# Patient Record
Sex: FEMALE | Race: WHITE | NOT HISPANIC OR LATINO | Employment: UNEMPLOYED | URBAN - METROPOLITAN AREA
[De-identification: names, ages, dates, MRNs, and addresses within clinical notes are randomized per-mention and may not be internally consistent; named-entity substitution may affect disease eponyms.]

---

## 2017-06-30 ENCOUNTER — HISTORICAL (OUTPATIENT)
Dept: ADMINISTRATIVE | Facility: HOSPITAL | Age: 42
End: 2017-06-30

## 2022-02-22 ENCOUNTER — TELEPHONE (OUTPATIENT)
Dept: ORTHOPEDICS | Facility: CLINIC | Age: 47
End: 2022-02-22
Payer: MEDICAID

## 2022-02-22 DIAGNOSIS — M25.511 RIGHT SHOULDER PAIN, UNSPECIFIED CHRONICITY: Primary | ICD-10-CM

## 2022-02-23 ENCOUNTER — OFFICE VISIT (OUTPATIENT)
Dept: ORTHOPEDICS | Facility: CLINIC | Age: 47
End: 2022-02-23
Payer: MEDICAID

## 2022-02-23 VITALS
WEIGHT: 113.75 LBS | BODY MASS INDEX: 18.95 KG/M2 | HEART RATE: 109 BPM | DIASTOLIC BLOOD PRESSURE: 96 MMHG | SYSTOLIC BLOOD PRESSURE: 165 MMHG | HEIGHT: 65 IN

## 2022-02-23 DIAGNOSIS — M75.121 COMPLETE TEAR OF RIGHT ROTATOR CUFF, UNSPECIFIED WHETHER TRAUMATIC: Primary | ICD-10-CM

## 2022-02-23 DIAGNOSIS — Z01.818 PRE-OP TESTING: ICD-10-CM

## 2022-02-23 DIAGNOSIS — M25.511 ARTHRALGIA OF RIGHT ACROMIOCLAVICULAR JOINT: ICD-10-CM

## 2022-02-23 PROCEDURE — 3077F PR MOST RECENT SYSTOLIC BLOOD PRESSURE >= 140 MM HG: ICD-10-PCS | Mod: CPTII,,, | Performed by: ORTHOPAEDIC SURGERY

## 2022-02-23 PROCEDURE — 3080F DIAST BP >= 90 MM HG: CPT | Mod: CPTII,,, | Performed by: ORTHOPAEDIC SURGERY

## 2022-02-23 PROCEDURE — 99204 OFFICE O/P NEW MOD 45 MIN: CPT | Mod: S$PBB,,, | Performed by: ORTHOPAEDIC SURGERY

## 2022-02-23 PROCEDURE — 99204 PR OFFICE/OUTPT VISIT, NEW, LEVL IV, 45-59 MIN: ICD-10-PCS | Mod: S$PBB,,, | Performed by: ORTHOPAEDIC SURGERY

## 2022-02-23 PROCEDURE — 1159F MED LIST DOCD IN RCRD: CPT | Mod: CPTII,,, | Performed by: ORTHOPAEDIC SURGERY

## 2022-02-23 PROCEDURE — 1160F RVW MEDS BY RX/DR IN RCRD: CPT | Mod: CPTII,,, | Performed by: ORTHOPAEDIC SURGERY

## 2022-02-23 PROCEDURE — 3080F PR MOST RECENT DIASTOLIC BLOOD PRESSURE >= 90 MM HG: ICD-10-PCS | Mod: CPTII,,, | Performed by: ORTHOPAEDIC SURGERY

## 2022-02-23 PROCEDURE — 99999 PR PBB SHADOW E&M-EST. PATIENT-LVL V: ICD-10-PCS | Mod: PBBFAC,,, | Performed by: ORTHOPAEDIC SURGERY

## 2022-02-23 PROCEDURE — 1159F PR MEDICATION LIST DOCUMENTED IN MEDICAL RECORD: ICD-10-PCS | Mod: CPTII,,, | Performed by: ORTHOPAEDIC SURGERY

## 2022-02-23 PROCEDURE — 3008F BODY MASS INDEX DOCD: CPT | Mod: CPTII,,, | Performed by: ORTHOPAEDIC SURGERY

## 2022-02-23 PROCEDURE — 3077F SYST BP >= 140 MM HG: CPT | Mod: CPTII,,, | Performed by: ORTHOPAEDIC SURGERY

## 2022-02-23 PROCEDURE — 99999 PR PBB SHADOW E&M-EST. PATIENT-LVL V: CPT | Mod: PBBFAC,,, | Performed by: ORTHOPAEDIC SURGERY

## 2022-02-23 PROCEDURE — 99215 OFFICE O/P EST HI 40 MIN: CPT | Mod: PBBFAC,PN | Performed by: ORTHOPAEDIC SURGERY

## 2022-02-23 PROCEDURE — 1160F PR REVIEW ALL MEDS BY PRESCRIBER/CLIN PHARMACIST DOCUMENTED: ICD-10-PCS | Mod: CPTII,,, | Performed by: ORTHOPAEDIC SURGERY

## 2022-02-23 PROCEDURE — 3008F PR BODY MASS INDEX (BMI) DOCUMENTED: ICD-10-PCS | Mod: CPTII,,, | Performed by: ORTHOPAEDIC SURGERY

## 2022-02-23 RX ORDER — DEXTROAMPHETAMINE SACCHARATE, AMPHETAMINE ASPARTATE, DEXTROAMPHETAMINE SULFATE AND AMPHETAMINE SULFATE 5; 5; 5; 5 MG/1; MG/1; MG/1; MG/1
TABLET ORAL
COMMUNITY

## 2022-02-23 RX ORDER — ESTRADIOL 0.5 MG/1
TABLET ORAL
COMMUNITY

## 2022-02-23 RX ORDER — CYANOCOBALAMIN 1000 UG/ML
INJECTION, SOLUTION INTRAMUSCULAR; SUBCUTANEOUS
COMMUNITY
Start: 2021-06-07

## 2022-02-23 RX ORDER — GABAPENTIN 300 MG/1
CAPSULE ORAL
COMMUNITY
Start: 2022-02-15

## 2022-02-23 RX ORDER — ERGOCALCIFEROL 1.25 MG/1
CAPSULE ORAL
COMMUNITY
Start: 2021-11-29

## 2022-02-23 RX ORDER — TIZANIDINE 4 MG/1
TABLET ORAL
COMMUNITY
Start: 2021-12-17

## 2022-02-23 RX ORDER — TRAMADOL HYDROCHLORIDE 50 MG/1
TABLET ORAL
COMMUNITY
End: 2022-07-05

## 2022-02-23 RX ORDER — ALENDRONATE SODIUM 70 MG/1
TABLET ORAL
COMMUNITY
End: 2022-07-12

## 2022-02-23 RX ORDER — HYDROCODONE BITARTRATE AND ACETAMINOPHEN 7.5; 325 MG/1; MG/1
TABLET ORAL
COMMUNITY
End: 2022-07-05

## 2022-02-23 RX ORDER — MELOXICAM 15 MG/1
TABLET ORAL
COMMUNITY
End: 2022-07-05

## 2022-02-23 RX ORDER — ALPRAZOLAM 0.5 MG/1
TABLET ORAL
COMMUNITY
Start: 2021-12-17

## 2022-02-23 RX ORDER — ALBUTEROL SULFATE 90 UG/1
2 AEROSOL, METERED RESPIRATORY (INHALATION)
COMMUNITY
Start: 2021-09-14 | End: 2022-06-29

## 2022-02-23 RX ORDER — SUCRALFATE 1 G/1
TABLET ORAL
COMMUNITY

## 2022-02-23 RX ORDER — SERTRALINE HYDROCHLORIDE 100 MG/1
TABLET, FILM COATED ORAL
COMMUNITY
Start: 2021-06-02 | End: 2022-06-29

## 2022-02-23 RX ORDER — TRAZODONE HYDROCHLORIDE 100 MG/1
TABLET ORAL
COMMUNITY
Start: 2022-01-24

## 2022-02-23 NOTE — PROGRESS NOTES
Patient ID:   Tierra Servin is a 46 y.o. female.    Chief Complaint:   Right rotator cuff tear    HPI:   Mrs. Servin is a 46 year old female with a history of undergoing right arthroscopic rotator cuff repair in [place] in June 2018. Post-operatively, she continued to have pain and weakness in the right shoulder. She attempted multiple rounds of PT and injections, all with minimal relief. She was recently seen and [place] and referred here for consideration of repeat surgery. Her current pain level is 7/10. The pain is worse at night and with attempted elevation of the extremity. She denies any current relieving factors.     Medications:    Current Outpatient Medications:     albuterol (PROVENTIL/VENTOLIN HFA) 90 mcg/actuation inhaler, Inhale 2 puffs into the lungs., Disp: , Rfl:     alendronate (FOSAMAX) 70 MG tablet, alendronate 70 mg tablet, Disp: , Rfl:     ALPRAZolam (XANAX) 0.5 MG tablet, alprazolam 0.5 mg tablet, Disp: , Rfl:     cyanocobalamin 1,000 mcg/mL injection, cyanocobalamin (vit B-12) 1,000 mcg/mL injection solution, Disp: , Rfl:     dextroamphetamine-amphetamine (ADDERALL) 20 mg tablet, dextroamphetamine-amphetamine 20 mg tablet, Disp: , Rfl:     ergocalciferol (ERGOCALCIFEROL) 50,000 unit Cap, Vitamin D2 1,250 mcg (50,000 unit) capsule, Disp: , Rfl:     estradioL (ESTRACE) 0.5 MG tablet, estradiol 0.5 mg tablet, Disp: , Rfl:     gabapentin (NEURONTIN) 300 MG capsule, gabapentin 300 mg capsule, Disp: , Rfl:     HYDROcodone-acetaminophen (NORCO) 7.5-325 mg per tablet, hydrocodone 7.5 mg-acetaminophen 325 mg tablet, Disp: , Rfl:     meloxicam (MOBIC) 15 MG tablet, meloxicam 15 mg tablet, Disp: , Rfl:     sertraline (ZOLOFT) 100 MG tablet, sertraline 100 mg tablet, Disp: , Rfl:     sucralfate (CARAFATE) 1 gram tablet, sucralfate 1 gram tablet, Disp: , Rfl:     tiZANidine (ZANAFLEX) 4 MG tablet, tizanidine 4 mg tablet, Disp: , Rfl:     traMADoL (ULTRAM) 50 mg tablet, tramadol  "50 mg tablet, Disp: , Rfl:     traZODone (DESYREL) 100 MG tablet, , Disp: , Rfl:     Allergies:  Review of patient's allergies indicates:   Allergen Reactions    Codeine Itching    Sulfamethoxazole-trimethoprim Itching       Past Medical History:  Past Medical History:   Diagnosis Date    ADD (attention deficit disorder)     Anxiety     Depression     Fibromyalgia     GERD (gastroesophageal reflux disease)     Osteopenia     Plantar fasciitis         Past Surgical History:  Past Surgical History:   Procedure Laterality Date     SECTION      GASTRIC BYPASS      HYSTERECTOMY      SHOULDER SURGERY         Social History:  Social History     Occupational History    Not on file   Tobacco Use    Smoking status: Never Smoker    Smokeless tobacco: Never Used   Substance and Sexual Activity    Alcohol use: Never    Drug use: Never    Sexual activity: Never       Family History:  History reviewed. No pertinent family history.     ROS:  Review of Systems   Musculoskeletal: Positive for joint pain, muscle weakness, myalgias and stiffness.   Neurological: Negative for numbness, paresthesias and sensory change.       Vitals:  BP (!) 165/96 (BP Location: Left arm, Patient Position: Sitting, BP Method: Medium (Automatic))   Pulse 109   Ht 5' 5" (1.651 m)   Wt 51.6 kg (113 lb 12.1 oz)   BMI 18.93 kg/m²     Physical Examination:  Comprehensive Orthopaedic Musculoskeletal Exam    General        Constitutional: appears stated age, well-developed and well-nourished    Scleral icterus: no    Labored breathing: no    Psychiatric: normal mood and affect and no acute distress    Neurological: alert and oriented x3    Skin: intact    Lymphadenopathy: none    Upper Extremity    Inspection - Upper      Inspection - Upper Right      Shoulder      Negative for: erythema          Range of Motion - Upper      Range of Motion - Upper Right        Shoulder forward flexion: 140      Shoulder external rotation: 30   "   Right Shoulder Exam     Tenderness   The patient is experiencing tenderness in the acromioclavicular joint.    Range of Motion   External rotation: 30   Forward flexion: 140   Internal rotation 0 degrees: L4     Muscle Strength   Internal rotation: 5/5   External rotation: 4/5   Supraspinatus: 5/5     Tests   Harmon test: positive  Cross arm: positive  Impingement: positive    Other   Erythema: absent  Scars: present  Sensation: normal  Pulse: present        Imaging:  I have ordered and independently reviewed the following imaging studies performed:  XR and MRI performed at [facility] demonstrates AC joint arthritis, a retear of the supraspinatus with retraction to the mid-humeral head.     Assessment:  1. Complete tear of right rotator cuff, unspecified whether traumatic    2. Arthralgia of right acromioclavicular joint      Plan:  I have reviewed the findings in detail with the patient. I have recommended revision rotator cuff repair, subacromial decompression, open distal clavicle excision, and any other indicated procedures. I have also discussed use of the Regeneten patch to potentially improve her chances of healing given her prior failure. She has given consent to proceed and has chosen a surgery date of March 17, 2022.      No follow-ups on file.

## 2022-03-07 RX ORDER — SODIUM CHLORIDE 9 MG/ML
INJECTION, SOLUTION INTRAVENOUS CONTINUOUS
Status: CANCELLED | OUTPATIENT
Start: 2022-03-07

## 2022-03-11 ENCOUNTER — TELEPHONE (OUTPATIENT)
Dept: ORTHOPEDICS | Facility: CLINIC | Age: 47
End: 2022-03-11
Payer: MEDICAID

## 2022-03-11 NOTE — TELEPHONE ENCOUNTER
----- Message from Anton Celaya MD sent at 3/11/2022  2:21 PM CST -----  Contact: [phone]/ Self  I will put her on for April 7th.       ----- Message -----  From: Marcos Hubbard MA  Sent: 3/11/2022   2:15 PM CST  To: Anton Celaya MD    She says the beginning of April whatever you have available.   ----- Message -----  From: nAton Celaya MD  Sent: 3/11/2022  12:58 PM CST  To: Marcos Hubbard MA    What date does she want to reschedule to?    ----- Message -----  From: Marcos Hubbrad MA  Sent: 3/11/2022  10:37 AM CST  To: Anton Celaya MD      ----- Message -----  From: Catie Alvarez  Sent: 3/11/2022  10:17 AM CST  To: Belia Walton Staff    Type: Requesting to speak with nurse    Who Called: Pt   Regarding: reschedule procedure on 03/17/2022   Would the patient rather a call back or a response via [facility]? Call back  Best Call Back Number: [phone]  Additional Information: n/a

## 2022-03-29 ENCOUNTER — TELEPHONE (OUTPATIENT)
Dept: ORTHOPEDICS | Facility: CLINIC | Age: 47
End: 2022-03-29
Payer: MEDICAID

## 2022-03-29 NOTE — TELEPHONE ENCOUNTER
----- Message from Anton Celaya MD sent at 3/29/2022  7:17 AM CDT -----  May 26th      ----- Message -----  From: Marcos Hubbard MA  Sent: 3/28/2022   3:36 PM CDT  To: Anton Celaya MD    Patient called and asked if she could push her surgery back until the end of May

## 2022-04-07 ENCOUNTER — HISTORICAL (OUTPATIENT)
Dept: ADMINISTRATIVE | Facility: HOSPITAL | Age: 47
End: 2022-04-07
Payer: MEDICAID

## 2022-04-23 VITALS
WEIGHT: 150 LBS | DIASTOLIC BLOOD PRESSURE: 86 MMHG | BODY MASS INDEX: 29.45 KG/M2 | SYSTOLIC BLOOD PRESSURE: 127 MMHG | HEIGHT: 60 IN

## 2022-06-01 ENCOUNTER — TELEPHONE (OUTPATIENT)
Dept: ORTHOPEDICS | Facility: CLINIC | Age: 47
End: 2022-06-01
Payer: MEDICAID

## 2022-06-01 NOTE — TELEPHONE ENCOUNTER
----- Message from Anton Celaya MD sent at 6/1/2022 11:57 AM CDT -----  Contact: [phone]  Okay  ----- Message -----  From: Marcos Hubbard MA  Sent: 6/1/2022  11:48 AM CDT  To: Anton Celaya MD    She says what about the following Thursday 6/30  ----- Message -----  From: Anton Celaya MD  Sent: 6/1/2022  11:33 AM CDT  To: Marcos Hubbard MA    I am out 6/23   She will need to pick a different Thursday    ----- Message -----  From: Marcos Hubbard MA  Sent: 6/1/2022   9:41 AM CDT  To: Anton Celaya MD      ----- Message -----  From: Catie Titus  Sent: 6/1/2022   9:28 AM CDT  To: [facility] Walton Staff    Type:  Needs Medical Advice    Who Called: pt called  Would the patient rather a call back or a response via [facility]? Either   Best Call Back Number: [phone]  Additional Information: pt would like to change her surgery date until 06/23/2022. Please call pt to advice

## 2022-06-23 ENCOUNTER — TELEPHONE (OUTPATIENT)
Dept: ORTHOPEDICS | Facility: CLINIC | Age: 47
End: 2022-06-23
Payer: MEDICAID

## 2022-06-23 DIAGNOSIS — Z01.818 PRE-OP TESTING: ICD-10-CM

## 2022-06-23 NOTE — TELEPHONE ENCOUNTER
----- Message from Kay Greenberg LPN sent at 6/23/2022 12:57 PM CDT -----  She will need a covid test prior to surgery on 6/30/22 thanks

## 2022-06-23 NOTE — TELEPHONE ENCOUNTER
----- Message from Jaleel Hidalgo sent at 6/23/2022  1:37 PM CDT -----  Contact: pt  Type: Requesting to speak with nurse        Who Called: PT  Regarding: would like a call   Would the patient rather a call back or a response via [facility]? Call back  Best Call Back Number: [phone]  Additional Information:

## 2022-06-23 NOTE — TELEPHONE ENCOUNTER
Pt lives three hours away and was told she can go to any facility to get her covid test prior to her surgery.

## 2022-06-27 ENCOUNTER — TELEPHONE (OUTPATIENT)
Dept: ORTHOPEDICS | Facility: CLINIC | Age: 47
End: 2022-06-27
Payer: MEDICAID

## 2022-06-27 NOTE — TELEPHONE ENCOUNTER
----- Message from Glo Escobedo sent at 6/27/2022  9:17 AM CDT -----  Type:  Needs Medical Advice    Who Called: pt   Symptoms (please be specific):  would like to get a call back to discuss Covid Test   She said she scheduled an appt with [facility] and would like to know if it will be okay to use them     Would the patient rather a call back or a response via [facility]?  Call   Best Call Back Number:  [phone]   Additional Information:

## 2022-06-29 ENCOUNTER — ANESTHESIA EVENT (OUTPATIENT)
Dept: SURGERY | Facility: HOSPITAL | Age: 47
End: 2022-06-29
Payer: MEDICAID

## 2022-06-29 ENCOUNTER — TELEPHONE (OUTPATIENT)
Dept: PREADMISSION TESTING | Facility: HOSPITAL | Age: 47
End: 2022-06-29
Payer: MEDICAID

## 2022-06-29 ENCOUNTER — HOSPITAL ENCOUNTER (OUTPATIENT)
Dept: PREADMISSION TESTING | Facility: HOSPITAL | Age: 47
Discharge: HOME | End: 2022-06-29
Attending: ORTHOPAEDIC SURGERY
Payer: MEDICAID

## 2022-06-29 VITALS
HEART RATE: 76 BPM | DIASTOLIC BLOOD PRESSURE: 79 MMHG | TEMPERATURE: 99 F | OXYGEN SATURATION: 99 % | RESPIRATION RATE: 16 BRPM | SYSTOLIC BLOOD PRESSURE: 132 MMHG | BODY MASS INDEX: 23.46 KG/M2 | WEIGHT: 119.5 LBS | HEIGHT: 60 IN

## 2022-06-29 DIAGNOSIS — Z01.818 PRE-OP TESTING: ICD-10-CM

## 2022-06-29 RX ORDER — LIDOCAINE HYDROCHLORIDE 10 MG/ML
1 INJECTION, SOLUTION EPIDURAL; INFILTRATION; INTRACAUDAL; PERINEURAL ONCE
Status: CANCELLED | OUTPATIENT
Start: 2022-06-29 | End: 2022-06-29

## 2022-06-29 RX ORDER — SODIUM CHLORIDE, SODIUM LACTATE, POTASSIUM CHLORIDE, CALCIUM CHLORIDE 600; 310; 30; 20 MG/100ML; MG/100ML; MG/100ML; MG/100ML
INJECTION, SOLUTION INTRAVENOUS CONTINUOUS
Status: CANCELLED | OUTPATIENT
Start: 2022-06-29

## 2022-06-29 RX ORDER — SCOLOPAMINE TRANSDERMAL SYSTEM 1 MG/1
1 PATCH, EXTENDED RELEASE TRANSDERMAL
Status: CANCELLED | OUTPATIENT
Start: 2022-06-29

## 2022-06-29 NOTE — DISCHARGE INSTRUCTIONS
Your surgery is scheduled for 6/30/22.    Please report to [facility] on the 1st Floor at 0530 a.m.    THIS TIME IS SUBJECT TO CHANGE.  YOU WILL RECEIVE A PHONE CALL THE DAY BEFORE SURGERY BY 3:30 PM TO CONFIRM YOUR TIME OF ARRIVAL.  IF YOU HAVE NOT RECEIVED A PHONE CALL BY 3:30 PM THE DAY BEFORE YOUR SURGERY PLEASE CALL [phone]     INSTRUCTIONS IMPORTANT!!!  ¨ Do not eat or drink after 12 midnight-including water, candy, gum, & mints. OK to brush teeth.      ____  Proceed to [facility] on *** for additional testing.        ____  Do not wear makeup, including mascara.  ____  No powder, lotions or creams to surgical area.  ____  Please remove all jewelry, including piercings and leave at home.  ____  No money or valuables needed. Please leave at home.  ____  Please bring any documents given by your doctor.  ____  If going home the same day, arrange for a ride home. You will not be able to             drive if Anesthesia was used.  ____  Children under 18 years require a parent / guardian present the entire time             they are in surgery / recovery.  ____  Wear loose fitting clothing. Allow for dressings, bandages.  ____  Stop Aspirin, Ibuprofen, Motrin, Aleve, Goody's/BC powders, Excedrine and Naproxen (NSAIDS) at least 3-5 days before surgery, unless otherwise instructed by your doctor, or the nurse.   You MAY use Tylenol/acetaminophen until day of surgery.  ____  Wash the surgical area with Hibiclens the night before surgery, and again the             morning of surgery.  Be sure to rinse hibiclens off completely (if instructed by   nurse).  ____  If you take diabetic medication, do not take am of surgery unless instructed by Doctor.  ____  Call MD for temperature above 101 degrees or any other signs of infection such as Urinary (bladder) infection, Upper respiratory infection, skin boils, etc.  ____ Stop taking any Fish Oil supplement or any Vitamins that contain Vitamin E at  least 5 days prior to surgery.  ____ Do Not wear your contact lenses the day of your procedure.  You may wear your glasses.      ____Do not shave surgical site for 3 days prior to surgery.  ____ Practice Good hand washing before, during, and after procedure.      I have read or had read and explained to me, and understand the above information.  Additional comments or instructions:  For additional questions call [phone]      ANESTHESIA SIDE EFFECTS  -For the first 24 hours after surgery:  Do not drive, use heavy equipment, make important decisions, or drink alcohol  -It is normal to feel sleepy for several hours.  Rest until you are more awake.  -Have someone stay with you, if needed.  They can watch for problems and help keep you safe.  -Some possible post anesthesia side effects include: nausea and vomiting, sore throat and hoarseness, sleepiness, and dizziness.        Pre-Op Bathing Instructions    Before surgery, you can play an important role in your own health.    Because skin is not sterile, we need to be sure that your skin is as free of germs as possible. By following the instructions below, you can reduce the number of germs on your skin before surgery.    IMPORTANT: You will need to shower with a special soap called Hibiclens*, available at any pharmacy.  If you are allergic to Chlorhexidine (the antiseptic in Hibiclens), use an antibacterial soap such as Dial Soap for your preoperative shower.  You will shower with Hibiclens both the night before your surgery and the morning of your surgery.  Do not use Hibiclens on the head, face or genitals to avoid injury to those areas.    STEP #1: THE NIGHT BEFORE YOUR SURGERY     Do not shave the area of your body where your surgery will be performed.  Shower and wash your hair and body as usual with your normal soap and shampoo.  Rinse your hair and body thoroughly after you shower to remove all soap residue.  With your hand, apply one packet of Hibiclens soap to  the surgical site.   Wash the site gently for five (5) minutes. Do not scrub your skin too hard.   Do not wash with your regular soap after Hibiclens is used.  Rinse your body thoroughly.  Pat yourself dry with a clean, soft towel.  Do not use lotion, cream, or powder.  Wear clean clothes.    STEP #2: THE MORNING OF YOUR SURGERY     Repeat Step #1.    * Not to be used by people allergic to Chlorhexidine.

## 2022-06-29 NOTE — ANESTHESIA PREPROCEDURE EVALUATION
2022     Tierra Servin is a 46 y.o., female scheduled for 1. REPAIR, ROTATOR CUFF, ARTHROSCOPIC 2. SUBACROMIAL DECOMPRESSION 3. OPEN DISTAL CLAVICLE EXCISION (Right Shoulder) 22.    Past Medical History:   Diagnosis Date    ADD (attention deficit disorder)     Anxiety     Depression     Fibromyalgia     GERD (gastroesophageal reflux disease)     Osteopenia     Plantar fasciitis      Past Surgical History:   Procedure Laterality Date     SECTION      GASTRIC BYPASS      HYSTERECTOMY      SHOULDER SURGERY         Pre-op Assessment    I have reviewed the Patient Summary Reports.     I have reviewed the Nursing Notes.    I have reviewed the Medications.     Review of Systems  Anesthesia Hx:  No problems with previous Anesthesia Denies Hx of Anesthetic complications  History of prior surgery of interest to airway management or planning: gastric bypass. Personal Hx of Anesthesia complications, Post-Operative Nausea/Vomiting, in the past, but not with recent anesthetics / prophylaxis  Unintended Unpleasent Intraoperative Awareness   Social:  Non-Smoker, No Alcohol Use    Cardiovascular:  Cardiovascular Normal Exercise tolerance: good  Denies Pacemaker.  Denies Hypertension.   Denies Angina.    Pulmonary:  Pulmonary Normal  Denies Shortness of breath.    Renal/:  Renal/ Normal     Hepatic/GI:   GERD, well controlled    Neurological:   Denies TIA. Denies CVA. Neuromuscular Disease,  Denies Seizures.    Endocrine:  Endocrine Normal    Psych:   Psychiatric History depression          Physical Exam  General: Well nourished and Cooperative    Airway:  Mallampati: II   Mouth Opening: Normal  TM Distance: Normal  Tongue: Normal  Neck ROM: Normal ROM    Dental:  Dentures  Upper and lower dentures  Chest/Lungs:  Clear to auscultation, Normal Respiratory Rate    Heart:  Rate: Normal  Rhythm:  Regular Rhythm  Sounds: Normal        Anesthesia Plan  Type of Anesthesia, risks & benefits discussed:    Anesthesia Type: Gen ETT, Regional  Intra-op Monitoring Plan: Standard ASA Monitors  Post Op Pain Control Plan: multimodal analgesia  Induction:  IV  Informed Consent: Informed consent signed with the Patient and all parties understand the risks and agree with anesthesia plan.  All questions answered.   ASA Score: 2  Anesthesia Plan Notes: Anesthesia consent to be signed prior to surgery 6/30/22      Ready For Surgery From Anesthesia Perspective.     .

## 2022-06-29 NOTE — PRE-PROCEDURE INSTRUCTIONS
Delia Servin [phone]    Allergies, medical, surgical, family and psychosocial histories reviewed with patient. Periop plan of care reviewed. Preop instructions given, including medications to take and to hold. Hibiclens soap and instructions on use given. Time allotted for questions to be addressed.  Patient verbalized understanding.

## 2022-06-30 ENCOUNTER — ANESTHESIA (OUTPATIENT)
Dept: SURGERY | Facility: HOSPITAL | Age: 47
End: 2022-06-30
Payer: MEDICAID

## 2022-06-30 ENCOUNTER — TELEPHONE (OUTPATIENT)
Dept: ORTHOPEDICS | Facility: CLINIC | Age: 47
End: 2022-06-30

## 2022-06-30 ENCOUNTER — HOSPITAL ENCOUNTER (OUTPATIENT)
Facility: HOSPITAL | Age: 47
Discharge: HOME | End: 2022-06-30
Attending: ORTHOPAEDIC SURGERY | Admitting: ORTHOPAEDIC SURGERY
Payer: MEDICAID

## 2022-06-30 VITALS
RESPIRATION RATE: 16 BRPM | OXYGEN SATURATION: 99 % | HEART RATE: 71 BPM | SYSTOLIC BLOOD PRESSURE: 122 MMHG | TEMPERATURE: 97 F | DIASTOLIC BLOOD PRESSURE: 78 MMHG | HEIGHT: 60 IN | BODY MASS INDEX: 23.36 KG/M2 | WEIGHT: 119 LBS

## 2022-06-30 DIAGNOSIS — M25.511 ARTHRALGIA OF RIGHT ACROMIOCLAVICULAR JOINT: ICD-10-CM

## 2022-06-30 DIAGNOSIS — M75.121 COMPLETE TEAR OF RIGHT ROTATOR CUFF, UNSPECIFIED WHETHER TRAUMATIC: ICD-10-CM

## 2022-06-30 PROCEDURE — 25000003 PHARM REV CODE 250: Performed by: NURSE ANESTHETIST, CERTIFIED REGISTERED

## 2022-06-30 PROCEDURE — 29827 SHO ARTHRS SRG RT8TR CUF RPR: CPT | Mod: RT,,, | Performed by: ORTHOPAEDIC SURGERY

## 2022-06-30 PROCEDURE — 63600175 PHARM REV CODE 636 W HCPCS: Performed by: NURSE ANESTHETIST, CERTIFIED REGISTERED

## 2022-06-30 PROCEDURE — 27201423 OPTIME MED/SURG SUP & DEVICES STERILE SUPPLY: Performed by: ORTHOPAEDIC SURGERY

## 2022-06-30 PROCEDURE — 71000033 HC RECOVERY, INTIAL HOUR: Performed by: ORTHOPAEDIC SURGERY

## 2022-06-30 PROCEDURE — 36000711: Performed by: ORTHOPAEDIC SURGERY

## 2022-06-30 PROCEDURE — C1776 JOINT DEVICE (IMPLANTABLE): HCPCS | Performed by: ORTHOPAEDIC SURGERY

## 2022-06-30 PROCEDURE — 01630 ANES OPN/ARTHR PX SHO JT NOS: CPT | Performed by: ORTHOPAEDIC SURGERY

## 2022-06-30 PROCEDURE — 29826 PR SHLDR ARTHROSCOP,PART ACROMIOPLAS: ICD-10-PCS | Mod: RT,,, | Performed by: ORTHOPAEDIC SURGERY

## 2022-06-30 PROCEDURE — 37000009 HC ANESTHESIA EA ADD 15 MINS: Performed by: ORTHOPAEDIC SURGERY

## 2022-06-30 PROCEDURE — 71000016 HC POSTOP RECOV ADDL HR: Performed by: ORTHOPAEDIC SURGERY

## 2022-06-30 PROCEDURE — 29827 PR SHLDR ARTHROSCOP,SURG,W/ROTAT CUFF REPR: ICD-10-PCS | Mod: RT,,, | Performed by: ORTHOPAEDIC SURGERY

## 2022-06-30 PROCEDURE — C1713 ANCHOR/SCREW BN/BN,TIS/BN: HCPCS | Performed by: ORTHOPAEDIC SURGERY

## 2022-06-30 PROCEDURE — 25000003 PHARM REV CODE 250: Performed by: STUDENT IN AN ORGANIZED HEALTH CARE EDUCATION/TRAINING PROGRAM

## 2022-06-30 PROCEDURE — 63600175 PHARM REV CODE 636 W HCPCS: Performed by: ORTHOPAEDIC SURGERY

## 2022-06-30 PROCEDURE — 25000003 PHARM REV CODE 250: Performed by: ANESTHESIOLOGY

## 2022-06-30 PROCEDURE — 36000710: Performed by: ORTHOPAEDIC SURGERY

## 2022-06-30 PROCEDURE — 37000008 HC ANESTHESIA 1ST 15 MINUTES: Performed by: ORTHOPAEDIC SURGERY

## 2022-06-30 PROCEDURE — 63600175 PHARM REV CODE 636 W HCPCS: Performed by: NURSE PRACTITIONER

## 2022-06-30 PROCEDURE — 63600175 PHARM REV CODE 636 W HCPCS: Performed by: STUDENT IN AN ORGANIZED HEALTH CARE EDUCATION/TRAINING PROGRAM

## 2022-06-30 PROCEDURE — 29826 SHO ARTHRS SRG DECOMPRESSION: CPT | Mod: RT,,, | Performed by: ORTHOPAEDIC SURGERY

## 2022-06-30 PROCEDURE — 64415 NJX AA&/STRD BRCH PLXS IMG: CPT | Performed by: STUDENT IN AN ORGANIZED HEALTH CARE EDUCATION/TRAINING PROGRAM

## 2022-06-30 PROCEDURE — 71000015 HC POSTOP RECOV 1ST HR: Performed by: ORTHOPAEDIC SURGERY

## 2022-06-30 PROCEDURE — 25000003 PHARM REV CODE 250: Performed by: NURSE PRACTITIONER

## 2022-06-30 DEVICE — ANCHOR BONE ARTHSCP DEL SYS: Type: IMPLANTABLE DEVICE | Site: SHOULDER | Status: FUNCTIONAL

## 2022-06-30 DEVICE — ANCHOR TENDON 8: Type: IMPLANTABLE DEVICE | Site: SHOULDER | Status: FUNCTIONAL

## 2022-06-30 RX ORDER — HYDROMORPHONE HYDROCHLORIDE 2 MG/ML
0.5 INJECTION, SOLUTION INTRAMUSCULAR; INTRAVENOUS; SUBCUTANEOUS EVERY 5 MIN PRN
Status: DISCONTINUED | OUTPATIENT
Start: 2022-06-30 | End: 2022-06-30 | Stop reason: HOSPADM

## 2022-06-30 RX ORDER — EPINEPHRINE 1 MG/ML
INJECTION, SOLUTION INTRACARDIAC; INTRAMUSCULAR; INTRAVENOUS; SUBCUTANEOUS
Status: DISCONTINUED | OUTPATIENT
Start: 2022-06-30 | End: 2022-06-30 | Stop reason: HOSPADM

## 2022-06-30 RX ORDER — LIDOCAINE HYDROCHLORIDE 20 MG/ML
INJECTION, SOLUTION EPIDURAL; INFILTRATION; INTRACAUDAL; PERINEURAL
Status: DISCONTINUED | OUTPATIENT
Start: 2022-06-30 | End: 2022-06-30

## 2022-06-30 RX ORDER — BUPIVACAINE HYDROCHLORIDE 2.5 MG/ML
INJECTION, SOLUTION EPIDURAL; INFILTRATION; INTRACAUDAL
Status: COMPLETED | OUTPATIENT
Start: 2022-06-30 | End: 2022-06-30

## 2022-06-30 RX ORDER — OXYCODONE HYDROCHLORIDE 5 MG/1
5 TABLET ORAL
Status: DISCONTINUED | OUTPATIENT
Start: 2022-06-30 | End: 2022-06-30 | Stop reason: HOSPADM

## 2022-06-30 RX ORDER — ONDANSETRON 2 MG/ML
4 INJECTION INTRAMUSCULAR; INTRAVENOUS DAILY PRN
Status: DISCONTINUED | OUTPATIENT
Start: 2022-06-30 | End: 2022-06-30 | Stop reason: HOSPADM

## 2022-06-30 RX ORDER — PROPOFOL 10 MG/ML
VIAL (ML) INTRAVENOUS
Status: DISCONTINUED | OUTPATIENT
Start: 2022-06-30 | End: 2022-06-30

## 2022-06-30 RX ORDER — OXYCODONE AND ACETAMINOPHEN 5; 325 MG/1; MG/1
1 TABLET ORAL EVERY 4 HOURS PRN
Qty: 28 TABLET | Refills: 0 | Status: SHIPPED | OUTPATIENT
Start: 2022-06-30 | End: 2022-07-05

## 2022-06-30 RX ORDER — MIDAZOLAM HYDROCHLORIDE 1 MG/ML
INJECTION, SOLUTION INTRAMUSCULAR; INTRAVENOUS
Status: DISCONTINUED | OUTPATIENT
Start: 2022-06-30 | End: 2022-06-30

## 2022-06-30 RX ORDER — DEXAMETHASONE SODIUM PHOSPHATE 4 MG/ML
INJECTION, SOLUTION INTRA-ARTICULAR; INTRALESIONAL; INTRAMUSCULAR; INTRAVENOUS; SOFT TISSUE
Status: DISCONTINUED | OUTPATIENT
Start: 2022-06-30 | End: 2022-06-30

## 2022-06-30 RX ORDER — SCOLOPAMINE TRANSDERMAL SYSTEM 1 MG/1
1 PATCH, EXTENDED RELEASE TRANSDERMAL
Status: DISCONTINUED | OUTPATIENT
Start: 2022-06-30 | End: 2022-06-30 | Stop reason: HOSPADM

## 2022-06-30 RX ORDER — SODIUM CHLORIDE, SODIUM LACTATE, POTASSIUM CHLORIDE, CALCIUM CHLORIDE 600; 310; 30; 20 MG/100ML; MG/100ML; MG/100ML; MG/100ML
INJECTION, SOLUTION INTRAVENOUS CONTINUOUS
Status: DISCONTINUED | OUTPATIENT
Start: 2022-06-30 | End: 2022-06-30 | Stop reason: HOSPADM

## 2022-06-30 RX ORDER — ROCURONIUM BROMIDE 10 MG/ML
INJECTION, SOLUTION INTRAVENOUS
Status: DISCONTINUED | OUTPATIENT
Start: 2022-06-30 | End: 2022-06-30

## 2022-06-30 RX ORDER — DEXMEDETOMIDINE HYDROCHLORIDE 100 UG/ML
INJECTION, SOLUTION INTRAVENOUS
Status: DISCONTINUED | OUTPATIENT
Start: 2022-06-30 | End: 2022-06-30

## 2022-06-30 RX ORDER — NAPROXEN 500 MG/1
500 TABLET ORAL 2 TIMES DAILY
Qty: 28 TABLET | Refills: 0 | Status: SHIPPED | OUTPATIENT
Start: 2022-06-30 | End: 2022-07-14

## 2022-06-30 RX ORDER — FENTANYL CITRATE 50 UG/ML
INJECTION, SOLUTION INTRAMUSCULAR; INTRAVENOUS
Status: DISCONTINUED | OUTPATIENT
Start: 2022-06-30 | End: 2022-06-30

## 2022-06-30 RX ORDER — NEOSTIGMINE METHYLSULFATE 1 MG/ML
INJECTION, SOLUTION INTRAVENOUS
Status: DISCONTINUED | OUTPATIENT
Start: 2022-06-30 | End: 2022-06-30

## 2022-06-30 RX ORDER — CEFAZOLIN SODIUM 2 G/50ML
2 SOLUTION INTRAVENOUS
Status: COMPLETED | OUTPATIENT
Start: 2022-06-30 | End: 2022-06-30

## 2022-06-30 RX ORDER — LIDOCAINE HYDROCHLORIDE 10 MG/ML
1 INJECTION, SOLUTION EPIDURAL; INFILTRATION; INTRACAUDAL; PERINEURAL ONCE
Status: DISCONTINUED | OUTPATIENT
Start: 2022-06-30 | End: 2022-06-30 | Stop reason: HOSPADM

## 2022-06-30 RX ORDER — SODIUM CHLORIDE 9 MG/ML
INJECTION, SOLUTION INTRAVENOUS CONTINUOUS
Status: DISCONTINUED | OUTPATIENT
Start: 2022-06-30 | End: 2022-06-30 | Stop reason: HOSPADM

## 2022-06-30 RX ORDER — ONDANSETRON 2 MG/ML
INJECTION INTRAMUSCULAR; INTRAVENOUS
Status: DISCONTINUED | OUTPATIENT
Start: 2022-06-30 | End: 2022-06-30

## 2022-06-30 RX ORDER — ONDANSETRON 4 MG/1
4 TABLET, FILM COATED ORAL 2 TIMES DAILY
Qty: 10 TABLET | Refills: 0 | Status: SHIPPED | OUTPATIENT
Start: 2022-06-30

## 2022-06-30 RX ORDER — ACETAMINOPHEN 10 MG/ML
INJECTION, SOLUTION INTRAVENOUS
Status: DISCONTINUED | OUTPATIENT
Start: 2022-06-30 | End: 2022-06-30

## 2022-06-30 RX ADMIN — OXYCODONE 5 MG: 5 TABLET ORAL at 10:06

## 2022-06-30 RX ADMIN — SODIUM CHLORIDE, SODIUM LACTATE, POTASSIUM CHLORIDE, AND CALCIUM CHLORIDE: .6; .31; .03; .02 INJECTION, SOLUTION INTRAVENOUS at 06:06

## 2022-06-30 RX ADMIN — GLYCOPYRROLATE 0.2 MG: 0.2 INJECTION, SOLUTION INTRAMUSCULAR; INTRAVITREAL at 07:06

## 2022-06-30 RX ADMIN — DEXMEDETOMIDINE HYDROCHLORIDE 4 MCG: 100 INJECTION, SOLUTION, CONCENTRATE INTRAVENOUS at 09:06

## 2022-06-30 RX ADMIN — DEXMEDETOMIDINE HYDROCHLORIDE 8 MCG: 100 INJECTION, SOLUTION, CONCENTRATE INTRAVENOUS at 07:06

## 2022-06-30 RX ADMIN — CEFAZOLIN SODIUM 2 G: 2 SOLUTION INTRAVENOUS at 07:06

## 2022-06-30 RX ADMIN — NEOSTIGMINE METHYLSULFATE 3 MG: 1 INJECTION INTRAVENOUS at 09:06

## 2022-06-30 RX ADMIN — LIDOCAINE HYDROCHLORIDE 100 MG: 20 INJECTION, SOLUTION EPIDURAL; INFILTRATION; INTRACAUDAL; PERINEURAL at 07:06

## 2022-06-30 RX ADMIN — ROCURONIUM BROMIDE 30 MG: 10 INJECTION, SOLUTION INTRAVENOUS at 07:06

## 2022-06-30 RX ADMIN — GLYCOPYRROLATE 0.4 MG: 0.2 INJECTION, SOLUTION INTRAMUSCULAR; INTRAVITREAL at 09:06

## 2022-06-30 RX ADMIN — DEXMEDETOMIDINE HYDROCHLORIDE 8 MCG: 100 INJECTION, SOLUTION, CONCENTRATE INTRAVENOUS at 09:06

## 2022-06-30 RX ADMIN — SCOPALAMINE 1 PATCH: 1 PATCH, EXTENDED RELEASE TRANSDERMAL at 06:06

## 2022-06-30 RX ADMIN — ACETAMINOPHEN 1000 MG: 10 INJECTION, SOLUTION INTRAVENOUS at 07:06

## 2022-06-30 RX ADMIN — PROPOFOL 180 MG: 10 INJECTION, EMULSION INTRAVENOUS at 07:06

## 2022-06-30 RX ADMIN — BUPIVACAINE HYDROCHLORIDE 10 ML: 2.5 INJECTION, SOLUTION EPIDURAL; INFILTRATION; INTRACAUDAL; PERINEURAL at 06:06

## 2022-06-30 RX ADMIN — MIDAZOLAM 5 MG: 1 INJECTION INTRAMUSCULAR; INTRAVENOUS at 06:06

## 2022-06-30 RX ADMIN — DEXAMETHASONE SODIUM PHOSPHATE 8 MG: 4 INJECTION, SOLUTION INTRA-ARTICULAR; INTRALESIONAL; INTRAMUSCULAR; INTRAVENOUS; SOFT TISSUE at 07:06

## 2022-06-30 RX ADMIN — ONDANSETRON 8 MG: 2 INJECTION, SOLUTION INTRAMUSCULAR; INTRAVENOUS at 09:06

## 2022-06-30 RX ADMIN — DEXMEDETOMIDINE HYDROCHLORIDE 12 MCG: 100 INJECTION, SOLUTION, CONCENTRATE INTRAVENOUS at 07:06

## 2022-06-30 RX ADMIN — FENTANYL CITRATE 100 MCG: 50 INJECTION, SOLUTION INTRAMUSCULAR; INTRAVENOUS at 07:06

## 2022-06-30 NOTE — TRANSFER OF CARE
Anesthesia Transfer of Care Note    Patient: Tierra Servin    Procedure(s) Performed: Procedure(s) (LRB):  1. REPAIR, ROTATOR CUFF, ARTHROSCOPIC 2. SUBACROMIAL DECOMPRESSION 3. OPEN DISTAL CLAVICLE EXCISION (Right)    Patient location: PACU    Anesthesia Type: MAC    Transport from OR: Transported from OR on 6-10 L/min O2 by face mask with adequate spontaneous ventilation    Post pain: adequate analgesia    Post assessment: no apparent anesthetic complications    Post vital signs: stable    Level of consciousness: awake    Nausea/Vomiting: no nausea/vomiting    Complications: none    Transfer of care protocol was followed      Last vitals:   Visit Vitals  /78   Pulse 70   Temp 36.9 °C (98.4 °F) (Skin)   Resp 16   Ht 5' (1.524 m)   Wt 54 kg (119 lb)   SpO2 97%   Breastfeeding No   BMI 23.24 kg/m²

## 2022-06-30 NOTE — OP NOTE
Facility:  [facility]    Date of surgery:  June 30, 2022    Surgeon:  Anton Celaya MD    First assistant:  Barak Patricio MD    Preoperative diagnosis:  Right rotator cuff retear    Indication:  To improve pain    Postoperative diagnosis:  Right rotator cuff retear  Right glenoid labral tear    Procedure:  Right revision rotator cuff repair  Right extensive arthroscopic debridement (glenoid labrum, subacromial decompression)  Placement of Smith Nephew Regeneten Bioinductive implant    The patient was identified in the preoperative holding area.  Written informed consent was obtained.  The correct extremity was marked.  Preoperative interscalene block was performed.  Patient was taken to the operating room.  The patient was transferred to the operating room table.  General anesthesia was administered.  The patient was positioned into a lateral decubitus position on a bean bag.  The right shoulder was prepped and draped in the usual sterile fashion.  10 lb of traction were used to suspend the arm.  Surgical time-out was performed.  Standard posterior portal was made.  Scope was introduced into the glenohumeral joint.  Anterior portal was made in the rotator interval.  There was fraying of the articular cartilage both on the humeral head and the glenoid.  There was fraying of the superior and anterior glenoid labrum.  There was fraying of the subscapularis.  The biceps tendon was absent.  There was a large full-thickness tear of the supraspinatus and the anterior aspect of the infraspinatus.  A shaver was introduced into the glenohumeral joint and used to debride the labrum down to a stable base of tissue.  The subscapularis was also debrided down to a stable base of tissue.  The the scope was removed from the glenohumeral joint.  The scope was position into the subacromial space.  A lateral portal was made.  A bursectomy was performed.  The acromion was exposed and found to have an anterior  hook.  A high-speed bur was used to perform a anterior acromioplasty.  Previous sutures were removed.  The sutures were present in old anchors that were lateral on the footprint.  The medial footprint was well outlined with of electrocautery.  A bur was used to create a bleeding bed of bone on the medial aspect of the rotator cuff footprint.  A cannula was placed in the lateral portal and another was placed in the anterior portal.  An accessory anterolateral portal was made for anchor placement.  Two 5.5 Trimble Nephew anchors were placed along the medial aspect of the rotator cuff footprint.  Each anchor had 1 suture present and 1 SutureTape present the patient had an L-shaped tear.  There was retraction to the midportion of the humeral head.  An anterior interval capsular release was performed to allow maximal mobility of the tear.  In addition a posterior and superior capsular release was performed to further mobilize the tear.  A side-to-side suture was 1st placed across the apex of the tear.  This suture was tied.  A 2nd side-to-side suture was placed more lateral to the apex but left on tied.  One suture from the anterior anchor was placed into the anterior aspect of the supraspinatus.  This suture was passed in a horizontal mattress fashion and then tied through the clear cannula.  The suture tape from the anterior anchor was placed posteriorly to allow a shift of the posterior rotator cuff tissue anteriorly.  This suture tape was placed in a horizontal mattress fashion.  The suture tape from the more posterior anchor was also placed in a horizontal mattress fashion in the posterior aspect of the rotator cuff.  I then proceeded to tie each 1 of these sutures starting anteriorly and working posteriorly.  Prior to tying the sutures I did place 1 more side-to-side suture in the more lateral position across the tear site.  After tying each 1 of the anchor sutures I then proceeded to tie the remaining side to side  sutures.  This helped bring the rotator cuff down to a near anatomic position.  To probing, the rotator cuff was well fixated to the greater tuberosity.  Two potentially enhance the healing potential, I decided to place a [employer] Regeneten bio inductive implant.  The cannula in the lateral portal was removed.  The deployment gun was introduced into the subacromial space over the tear site.  I chose a large patch.  Through my accessory anterolateral portal, I used the PDS staple device to secure the patch down until it was well secured.  The deployment gun was then removed and additional PDS staples were placed.  The implant appeared well position.  The instruments were removed from the joint.  The portal incisions were closed with 2-0 nylon suture.  A sterile dressing was applied.  An abduction sling was applied.  The patient was awakened and transferred to the postanesthesia care unit in stable condition.    Estimated blood loss:  Less than 50 cc    Complications:  None known    Drains:  None

## 2022-06-30 NOTE — PLAN OF CARE
Pt meets criteria for discharge. VSS. Tolerating clear liquids. Voiding spontaneously. AVS given to pt and spouse, states understanding of discharge instructions. IV removed. Prescriptions delivered to bedside. Pt to be discharged to home.

## 2022-06-30 NOTE — H&P
Updated H&P    NO changes in the H&P below. All pertinent history reviewed. OR today for Right revision rotator cuff repair, subacromial decompression, open distal clavicle excision, and any other indicated procedures.  _________________________________________________________      Patient ID:   Tierra Servin is a 46 y.o. female.     Chief Complaint:   Right rotator cuff tear     HPI:   Mrs. Servin is a 46 year old female with a history of undergoing right arthroscopic rotator cuff repair in [place] in June 2018. Post-operatively, she continued to have pain and weakness in the right shoulder. She attempted multiple rounds of PT and injections, all with minimal relief. She was recently seen and [place] and referred here for consideration of repeat surgery. Her current pain level is 7/10. The pain is worse at night and with attempted elevation of the extremity. She denies any current relieving factors.      Medications:     Current Outpatient Medications:     albuterol (PROVENTIL/VENTOLIN HFA) 90 mcg/actuation inhaler, Inhale 2 puffs into the lungs., Disp: , Rfl:     alendronate (FOSAMAX) 70 MG tablet, alendronate 70 mg tablet, Disp: , Rfl:     ALPRAZolam (XANAX) 0.5 MG tablet, alprazolam 0.5 mg tablet, Disp: , Rfl:     cyanocobalamin 1,000 mcg/mL injection, cyanocobalamin (vit B-12) 1,000 mcg/mL injection solution, Disp: , Rfl:     dextroamphetamine-amphetamine (ADDERALL) 20 mg tablet, dextroamphetamine-amphetamine 20 mg tablet, Disp: , Rfl:     ergocalciferol (ERGOCALCIFEROL) 50,000 unit Cap, Vitamin D2 1,250 mcg (50,000 unit) capsule, Disp: , Rfl:     estradioL (ESTRACE) 0.5 MG tablet, estradiol 0.5 mg tablet, Disp: , Rfl:     gabapentin (NEURONTIN) 300 MG capsule, gabapentin 300 mg capsule, Disp: , Rfl:     HYDROcodone-acetaminophen (NORCO) 7.5-325 mg per tablet, hydrocodone 7.5 mg-acetaminophen 325 mg tablet, Disp: , Rfl:     meloxicam (MOBIC) 15 MG tablet, meloxicam 15 mg tablet, Disp: , Rfl:      "sertraline (ZOLOFT) 100 MG tablet, sertraline 100 mg tablet, Disp: , Rfl:     sucralfate (CARAFATE) 1 gram tablet, sucralfate 1 gram tablet, Disp: , Rfl:     tiZANidine (ZANAFLEX) 4 MG tablet, tizanidine 4 mg tablet, Disp: , Rfl:     traMADoL (ULTRAM) 50 mg tablet, tramadol 50 mg tablet, Disp: , Rfl:     traZODone (DESYREL) 100 MG tablet, , Disp: , Rfl:      Allergies:       Review of patient's allergies indicates:   Allergen Reactions    Codeine Itching    Sulfamethoxazole-trimethoprim Itching         Past Medical History:       Past Medical History:   Diagnosis Date    ADD (attention deficit disorder)      Anxiety      Depression      Fibromyalgia      GERD (gastroesophageal reflux disease)      Osteopenia      Plantar fasciitis           Past Surgical History:        Past Surgical History:   Procedure Laterality Date     SECTION        GASTRIC BYPASS        HYSTERECTOMY        SHOULDER SURGERY             Social History:  Social History           Occupational History    Not on file   Tobacco Use    Smoking status: Never Smoker    Smokeless tobacco: Never Used   Substance and Sexual Activity    Alcohol use: Never    Drug use: Never    Sexual activity: Never         Family History:  History reviewed. No pertinent family history.      ROS:  Review of Systems   Musculoskeletal: Positive for joint pain, muscle weakness, myalgias and stiffness.   Neurological: Negative for numbness, paresthesias and sensory change.         Vitals:  BP (!) 165/96 (BP Location: Left arm, Patient Position: Sitting, BP Method: Medium (Automatic))   Pulse 109   Ht 5' 5" (1.651 m)   Wt 51.6 kg (113 lb 12.1 oz)   BMI 18.93 kg/m²      Physical Examination:  Comprehensive Orthopaedic Musculoskeletal Exam     General        Constitutional: appears stated age, well-developed and well-nourished    Scleral icterus: no    Labored breathing: no    Psychiatric: normal mood and affect and no acute distress    Neurological: alert and " oriented x3    Skin: intact    Lymphadenopathy: none     Upper Extremity     Inspection - Upper       Inspection - Upper Right       Shoulder      Negative for: erythema           Range of Motion - Upper       Range of Motion - Upper Right         Shoulder forward flexion: 140      Shoulder external rotation: 30     Right Shoulder Exam      Tenderness   The patient is experiencing tenderness in the acromioclavicular joint.     Range of Motion   External rotation: 30   Forward flexion: 140   Internal rotation 0 degrees: L4      Muscle Strength   Internal rotation: 5/5   External rotation: 4/5   Supraspinatus: 5/5      Tests   Harmon test: positive  Cross arm: positive  Impingement: positive     Other   Erythema: absent  Scars: present  Sensation: normal  Pulse: present           Imaging:  I have ordered and independently reviewed the following imaging studies performed:  XR and MRI performed at [facility] demonstrates AC joint arthritis, a retear of the supraspinatus with retraction to the mid-humeral head.      Assessment:  1. Complete tear of right rotator cuff, unspecified whether traumatic    2. Arthralgia of right acromioclavicular joint       Plan:  I have reviewed the findings in detail with the patient. I have recommended revision rotator cuff repair, subacromial decompression, open distal clavicle excision, and any other indicated procedures. I have also discussed use of the Regeneten patch to potentially improve her chances of healing given her prior failure. She has given consent to proceed.          Barak Patricio MD,   [facility]     I have reviewed the H&P. There are no interval changes to report.

## 2022-06-30 NOTE — ANESTHESIA PROCEDURE NOTES
Intubation    Date/Time: 6/30/2022 7:14 AM  Performed by: Delia Andrea CRNA  Authorized by: Kale Aguirre MD     Intubation:     Induction:  Intravenous    Intubated:  Postinduction    Mask Ventilation:  Easy mask    Attempts:  1    Attempted By:  Student    Method of Intubation:  Direct    Blade:  Bruce 3    Laryngeal View Grade: Grade I - full view of cords      Difficult Airway Encountered?: No      Complications:  None    Airway Device:  Oral endotracheal tube    Airway Device Size:  7.5    Style/Cuff Inflation:  Cuffed (inflated to minimal occlusive pressure)    Tube secured:  22    Secured at:  The lips    Placement Verified By:  Capnometry    Complicating Factors:  None    Findings Post-Intubation:  BS equal bilateral

## 2022-06-30 NOTE — ANESTHESIA PROCEDURE NOTES
Peripheral Block    Patient location during procedure: pre-op   Block not for primary anesthetic.  Reason for block: at surgeon's request and post-op pain management   Post-op Pain Location: Right Shoulder   Start time: 6/30/2022 6:42 AM  Timeout: 6/30/2022 6:40 AM   End time: 6/30/2022 6:46 AM    Staffing  Authorizing Provider: Roman Mercedes MD  Performing Provider: Roman Mercedes MD    Staffing  Other anesthesia staff: Kale Aguirre MD  Preanesthetic Checklist  Completed: patient identified, IV checked, site marked, risks and benefits discussed, surgical consent, monitors and equipment checked, pre-op evaluation and timeout performed  Peripheral Block  Patient position: sitting  Prep: ChloraPrep  Patient monitoring: heart rate, continuous pulse ox and frequent blood pressure checks  Block type: interscalene  Laterality: right  Injection technique: single shot  Needle  Needle type: Stimuplex   Needle gauge: 21 G  Needle length: 4 in  Needle localization: ultrasound guidance     Assessment  Injection assessment: negative parasthesia, negative aspiration and local visualized surrounding nerve  Paresthesia pain: none  Heart rate change: no  Slow fractionated injection: yes  Pain Tolerance: comfortable throughout block and no complaints  Medications:    Medications: bupivacaine (pf) (MARCAINE) injection 0.25% - Perineural   10 mL - 6/30/2022 6:45:00 AM    Additional Notes  10cc 0.25% bupivicaine, 10cc exparel used for block

## 2022-06-30 NOTE — DISCHARGE SUMMARY
[facility] - Surgery (Hospital)  Brief Operative Note    Surgery Date: 6/30/2022     Surgeon(s) and Role:     * Anton Celaya MD - Primary    Assisting Surgeon:   Barak Patricio MD - Resident     Pre-op Diagnosis:    Right rotator cuff retear    Post-op Diagnosis:    Right rotator cuff retear  Right glenoid labral tear    Procedure:  Right revision rotator cuff repair  Right extensive arthroscopic debridement (glenoid labrum, subacromial decompression)  Placement of [employer] Neph[employer]inmind Regeneten Bioinductive implant    Anesthesia: General/Regional    Operative Findings: See full operative report for further details    Estimated Blood Loss:   Less than 50cc         Specimens:   Specimen (24h ago, onward)                None              Discharge Note    OUTCOME: Patient tolerated treatment/procedure well without complication and is now ready for discharge.    DISPOSITION: Home or Self Care    FINAL DIAGNOSIS:    Right rotator cuff retear  Right glenoid labral tear    FOLLOWUP: In clinic in 2 weeks     DISCHARGE INSTRUCTIONS:    Discharge Procedure Orders   Notify your health care provider if you experience any of the following:  temperature >100.4     Notify your health care provider if you experience any of the following:  persistent nausea and vomiting or diarrhea     Notify your health care provider if you experience any of the following:  redness, tenderness, or signs of infection (pain, swelling, redness, odor or green/yellow discharge around incision site)     Notify your health care provider if you experience any of the following:  difficulty breathing or increased cough     Leave dressing on - Keep it clean, dry, and intact until clinic visit     Weight bearing restrictions (specify):   Order Comments: NWB DAMIAN Patricio MD  [facility]     I have reviewed the notes, assessments, and/or procedures performed by Dr. Patricio, I concur with her/his documentation of Tierra Ashby

## 2022-06-30 NOTE — TELEPHONE ENCOUNTER
----- Message from Anton Celaya MD sent at 6/30/2022  9:59 AM CDT -----  Regarding: PO Appt  Pt needs appt in about 2 weeks

## 2022-06-30 NOTE — PLAN OF CARE
Patient has met PACU discharge criteria, VSS, pain well controlled. Family updated by phone. Released from PACU by Dr. Crook

## 2022-06-30 NOTE — ANESTHESIA POSTPROCEDURE EVALUATION
Anesthesia Post Evaluation    Patient: Tierra Servin    Procedure(s) Performed: Procedure(s) (LRB):  1. REPAIR, ROTATOR CUFF, ARTHROSCOPIC 2. SUBACROMIAL DECOMPRESSION 3. OPEN DISTAL CLAVICLE EXCISION (Right)    Final Anesthesia Type: general      Patient location during evaluation: PACU  Patient participation: No - Unable to Participate, Intubation  Level of consciousness: awake and alert  Post-procedure vital signs: reviewed and stable  Pain management: adequate  Airway patency: patent  MARCIA mitigation strategies: Multimodal analgesia  PONV status at discharge: No PONV  Anesthetic complications: no      Cardiovascular status: hemodynamically stable and blood pressure returned to baseline  Respiratory status: room air, unassisted and spontaneous ventilation  Hydration status: euvolemic  Follow-up not needed.          Vitals Value Taken Time   /78 06/30/22 1145   Temp 36.3 °C (97.3 °F) 06/30/22 1145   Pulse 71 06/30/22 1145   Resp 16 06/30/22 1145   SpO2 99 % 06/30/22 1145         Event Time   Out of Recovery 11:01:36         Pain/Henry Score: Pain Rating Prior to Med Admin: 4 (6/30/2022 10:46 AM)  Henry Score: 10 (6/30/2022 12:05 PM)

## 2022-07-05 ENCOUNTER — TELEPHONE (OUTPATIENT)
Dept: ORTHOPEDICS | Facility: CLINIC | Age: 47
End: 2022-07-05
Payer: MEDICAID

## 2022-07-05 RX ORDER — OXYCODONE AND ACETAMINOPHEN 5; 325 MG/1; MG/1
1 TABLET ORAL EVERY 6 HOURS PRN
Qty: 28 TABLET | Refills: 0 | Status: SHIPPED | OUTPATIENT
Start: 2022-07-05 | End: 2022-07-12

## 2022-07-05 NOTE — TELEPHONE ENCOUNTER
----- Message from Marcos Hubbard MA sent at 7/5/2022  8:27 AM CDT -----  Contact: [phone]    ----- Message -----  From: Catie Titus  Sent: 7/5/2022   8:14 AM CDT  To: Belia Walton Staff      Type:  RX Refill Request    Who Called: pt called   Refill or New Rx:refill  RX Name and Strength:oxyCODONE-acetaminophen (PERCOCET) 5-325 mg per tablet  How is the patient currently taking it? (ex. 1XDay):  Is this a 30 day or 90 day RX: 30 days  Preferred Pharmacy with phone number:[facility] [address] [facility] [id number] - [place], [place]  [address]  Local or Mail Order:local  Ordering Provider:Dr. Celaya  Would the patient rather a call back or a response via [facility]? Call back  Best Call Back Number:[phone]  Additional Information:

## 2022-07-11 ENCOUNTER — TELEPHONE (OUTPATIENT)
Dept: ORTHOPEDICS | Facility: CLINIC | Age: 47
End: 2022-07-11
Payer: MEDICAID

## 2022-07-11 NOTE — TELEPHONE ENCOUNTER
----- Message from Catie Titus sent at 7/11/2022  2:45 PM CDT -----  Contact: [phone]  Type:  Needs Medical Advice    Who Called: pt called   Symptoms (please be specific): pt not feeling well  How long has patient had these symptoms:  after surgery   Would the patient rather a call back or a response via [facility]? Call back   Best Call Back Number: [phone]  Additional Information: pt needs to speak to nurse today. Please call pt to advice

## 2022-07-12 ENCOUNTER — OFFICE VISIT (OUTPATIENT)
Dept: ORTHOPEDICS | Facility: CLINIC | Age: 47
End: 2022-07-12
Payer: MEDICAID

## 2022-07-12 VITALS
HEART RATE: 101 BPM | BODY MASS INDEX: 23.36 KG/M2 | DIASTOLIC BLOOD PRESSURE: 91 MMHG | WEIGHT: 119 LBS | HEIGHT: 60 IN | SYSTOLIC BLOOD PRESSURE: 143 MMHG

## 2022-07-12 DIAGNOSIS — Z98.890 S/P RIGHT ROTATOR CUFF REPAIR: Primary | ICD-10-CM

## 2022-07-12 PROCEDURE — 1159F PR MEDICATION LIST DOCUMENTED IN MEDICAL RECORD: ICD-10-PCS | Mod: CPTII,,, | Performed by: ORTHOPAEDIC SURGERY

## 2022-07-12 PROCEDURE — 99024 POSTOP FOLLOW-UP VISIT: CPT | Mod: ,,, | Performed by: ORTHOPAEDIC SURGERY

## 2022-07-12 PROCEDURE — 99999 PR PBB SHADOW E&M-EST. PATIENT-LVL IV: CPT | Mod: PBBFAC,,, | Performed by: ORTHOPAEDIC SURGERY

## 2022-07-12 PROCEDURE — 3077F SYST BP >= 140 MM HG: CPT | Mod: CPTII,,, | Performed by: ORTHOPAEDIC SURGERY

## 2022-07-12 PROCEDURE — 99024 PR POST-OP FOLLOW-UP VISIT: ICD-10-PCS | Mod: ,,, | Performed by: ORTHOPAEDIC SURGERY

## 2022-07-12 PROCEDURE — 3080F PR MOST RECENT DIASTOLIC BLOOD PRESSURE >= 90 MM HG: ICD-10-PCS | Mod: CPTII,,, | Performed by: ORTHOPAEDIC SURGERY

## 2022-07-12 PROCEDURE — 3008F BODY MASS INDEX DOCD: CPT | Mod: CPTII,,, | Performed by: ORTHOPAEDIC SURGERY

## 2022-07-12 PROCEDURE — 3008F PR BODY MASS INDEX (BMI) DOCUMENTED: ICD-10-PCS | Mod: CPTII,,, | Performed by: ORTHOPAEDIC SURGERY

## 2022-07-12 PROCEDURE — 3077F PR MOST RECENT SYSTOLIC BLOOD PRESSURE >= 140 MM HG: ICD-10-PCS | Mod: CPTII,,, | Performed by: ORTHOPAEDIC SURGERY

## 2022-07-12 PROCEDURE — 99999 PR PBB SHADOW E&M-EST. PATIENT-LVL IV: ICD-10-PCS | Mod: PBBFAC,,, | Performed by: ORTHOPAEDIC SURGERY

## 2022-07-12 PROCEDURE — 1159F MED LIST DOCD IN RCRD: CPT | Mod: CPTII,,, | Performed by: ORTHOPAEDIC SURGERY

## 2022-07-12 PROCEDURE — 99214 OFFICE O/P EST MOD 30 MIN: CPT | Mod: PBBFAC,PN | Performed by: ORTHOPAEDIC SURGERY

## 2022-07-12 PROCEDURE — 1160F PR REVIEW ALL MEDS BY PRESCRIBER/CLIN PHARMACIST DOCUMENTED: ICD-10-PCS | Mod: CPTII,,, | Performed by: ORTHOPAEDIC SURGERY

## 2022-07-12 PROCEDURE — 1160F RVW MEDS BY RX/DR IN RCRD: CPT | Mod: CPTII,,, | Performed by: ORTHOPAEDIC SURGERY

## 2022-07-12 PROCEDURE — 3080F DIAST BP >= 90 MM HG: CPT | Mod: CPTII,,, | Performed by: ORTHOPAEDIC SURGERY

## 2022-07-12 RX ORDER — OXYCODONE AND ACETAMINOPHEN 5; 325 MG/1; MG/1
1 TABLET ORAL EVERY 6 HOURS PRN
Qty: 28 TABLET | Refills: 0 | Status: SHIPPED | OUTPATIENT
Start: 2022-07-12

## 2022-07-12 RX ORDER — METHOCARBAMOL 500 MG/1
500 TABLET, FILM COATED ORAL 3 TIMES DAILY PRN
COMMUNITY
Start: 2022-06-15

## 2022-07-12 RX ORDER — HYDROCODONE BITARTRATE AND ACETAMINOPHEN 10; 325 MG/1; MG/1
1 TABLET ORAL EVERY 8 HOURS PRN
COMMUNITY
Start: 2022-06-20 | End: 2022-07-12

## 2022-07-12 RX ORDER — LINACLOTIDE 72 UG/1
72 CAPSULE, GELATIN COATED ORAL EVERY MORNING
COMMUNITY
Start: 2022-06-16

## 2022-07-12 RX ORDER — OMEPRAZOLE 40 MG/1
40 CAPSULE, DELAYED RELEASE ORAL DAILY
COMMUNITY
Start: 2022-06-15

## 2022-07-12 NOTE — PROGRESS NOTES
Patient ID:   Tierra Servin is a 46 y.o. female.    Chief Complaint:   Surgical aftercare s/p R revision RCR, Ish     HPI:   The patient is returning for suture removal . She is reporting significant pain.     Medications:    Current Outpatient Medications:     ALPRAZolam (XANAX) 0.5 MG tablet, alprazolam 0.5 mg tablet, Disp: , Rfl:     cyanocobalamin 1,000 mcg/mL injection, cyanocobalamin (vit B-12) 1,000 mcg/mL injection solution, Disp: , Rfl:     dextroamphetamine-amphetamine (ADDERALL) 20 mg tablet, dextroamphetamine-amphetamine 20 mg tablet, Disp: , Rfl:     ergocalciferol (ERGOCALCIFEROL) 50,000 unit Cap, Vitamin D2 1,250 mcg (50,000 unit) capsule, Disp: , Rfl:     estradioL (ESTRACE) 0.5 MG tablet, estradiol 0.5 mg tablet, Disp: , Rfl:     gabapentin (NEURONTIN) 300 MG capsule, gabapentin 300 mg capsule, Disp: , Rfl:     LINZESS 72 mcg Cap capsule, Take 72 mcg by mouth every morning., Disp: , Rfl:     methocarbamoL (ROBAXIN) 500 MG Tab, Take 500 mg by mouth 3 (three) times daily as needed., Disp: , Rfl:     naproxen (NAPROSYN) 500 MG tablet, Take 1 tablet (500 mg total) by mouth 2 (two) times daily. for 14 days, Disp: 28 tablet, Rfl: 0    omeprazole (PRILOSEC) 40 MG capsule, Take 40 mg by mouth once daily., Disp: , Rfl:     ondansetron (ZOFRAN) 4 MG tablet, Take 1 tablet (4 mg total) by mouth 2 (two) times daily., Disp: 10 tablet, Rfl: 0    sucralfate (CARAFATE) 1 gram tablet, sucralfate 1 gram tablet, Disp: , Rfl:     tiZANidine (ZANAFLEX) 4 MG tablet, tizanidine 4 mg tablet, Disp: , Rfl:     traZODone (DESYREL) 100 MG tablet, , Disp: , Rfl:     oxyCODONE-acetaminophen (PERCOCET) 5-325 mg per tablet, Take 1 tablet by mouth every 6 (six) hours as needed for Pain., Disp: 28 tablet, Rfl: 0    Allergies:  Review of patient's allergies indicates:   Allergen Reactions    Codeine Itching    Sulfamethoxazole-trimethoprim Itching       Vitals:  BP (!) 143/91 (BP Location: Left arm, Patient  Position: Sitting, BP Method: Large (Automatic))   Pulse 101   Ht 5' (1.524 m)   Wt 54 kg (119 lb)   BMI 23.24 kg/m²     Physical Examination:  Ortho Exam   Right shoulder exam:  Incisions C/D/I.    Assessment:  1. S/P right rotator cuff repair        Plan:  Suture removal today. HEP. RTC in 4 weeks.     Orders Placed This Encounter    oxyCODONE-acetaminophen (PERCOCET) 5-325 mg per tablet     No follow-ups on file.

## 2022-08-09 ENCOUNTER — OFFICE VISIT (OUTPATIENT)
Dept: ORTHOPEDICS | Facility: CLINIC | Age: 47
End: 2022-08-09
Payer: MEDICAID

## 2022-08-09 VITALS
WEIGHT: 114.63 LBS | HEIGHT: 60 IN | SYSTOLIC BLOOD PRESSURE: 144 MMHG | HEART RATE: 99 BPM | BODY MASS INDEX: 22.51 KG/M2 | DIASTOLIC BLOOD PRESSURE: 93 MMHG

## 2022-08-09 DIAGNOSIS — Z98.890 S/P RIGHT ROTATOR CUFF REPAIR: Primary | ICD-10-CM

## 2022-08-09 PROCEDURE — 1160F PR REVIEW ALL MEDS BY PRESCRIBER/CLIN PHARMACIST DOCUMENTED: ICD-10-PCS | Mod: CPTII,,, | Performed by: ORTHOPAEDIC SURGERY

## 2022-08-09 PROCEDURE — 99024 PR POST-OP FOLLOW-UP VISIT: ICD-10-PCS | Mod: ,,, | Performed by: ORTHOPAEDIC SURGERY

## 2022-08-09 PROCEDURE — 99999 PR PBB SHADOW E&M-EST. PATIENT-LVL III: CPT | Mod: PBBFAC,,, | Performed by: ORTHOPAEDIC SURGERY

## 2022-08-09 PROCEDURE — 99024 POSTOP FOLLOW-UP VISIT: CPT | Mod: ,,, | Performed by: ORTHOPAEDIC SURGERY

## 2022-08-09 PROCEDURE — 3008F PR BODY MASS INDEX (BMI) DOCUMENTED: ICD-10-PCS | Mod: CPTII,,, | Performed by: ORTHOPAEDIC SURGERY

## 2022-08-09 PROCEDURE — 3080F DIAST BP >= 90 MM HG: CPT | Mod: CPTII,,, | Performed by: ORTHOPAEDIC SURGERY

## 2022-08-09 PROCEDURE — 3077F SYST BP >= 140 MM HG: CPT | Mod: CPTII,,, | Performed by: ORTHOPAEDIC SURGERY

## 2022-08-09 PROCEDURE — 3077F PR MOST RECENT SYSTOLIC BLOOD PRESSURE >= 140 MM HG: ICD-10-PCS | Mod: CPTII,,, | Performed by: ORTHOPAEDIC SURGERY

## 2022-08-09 PROCEDURE — 3080F PR MOST RECENT DIASTOLIC BLOOD PRESSURE >= 90 MM HG: ICD-10-PCS | Mod: CPTII,,, | Performed by: ORTHOPAEDIC SURGERY

## 2022-08-09 PROCEDURE — 1159F PR MEDICATION LIST DOCUMENTED IN MEDICAL RECORD: ICD-10-PCS | Mod: CPTII,,, | Performed by: ORTHOPAEDIC SURGERY

## 2022-08-09 PROCEDURE — 99213 OFFICE O/P EST LOW 20 MIN: CPT | Mod: PBBFAC,PN | Performed by: ORTHOPAEDIC SURGERY

## 2022-08-09 PROCEDURE — 1160F RVW MEDS BY RX/DR IN RCRD: CPT | Mod: CPTII,,, | Performed by: ORTHOPAEDIC SURGERY

## 2022-08-09 PROCEDURE — 1159F MED LIST DOCD IN RCRD: CPT | Mod: CPTII,,, | Performed by: ORTHOPAEDIC SURGERY

## 2022-08-09 PROCEDURE — 3008F BODY MASS INDEX DOCD: CPT | Mod: CPTII,,, | Performed by: ORTHOPAEDIC SURGERY

## 2022-08-09 PROCEDURE — 99999 PR PBB SHADOW E&M-EST. PATIENT-LVL III: ICD-10-PCS | Mod: PBBFAC,,, | Performed by: ORTHOPAEDIC SURGERY

## 2022-08-09 RX ORDER — CETIRIZINE HYDROCHLORIDE 10 MG/1
1 TABLET ORAL DAILY
COMMUNITY
Start: 2022-08-02

## 2022-08-09 NOTE — PROGRESS NOTES
Patient ID:   Tierra Servin is a 46 y.o. female.    Chief Complaint:   Approximately six weeks s/p right revision rotator cuff repair, extensive arthroscopic debridement, Regeneten patch    HPI:   Patient is now about 6 weeks out from her surgery.  She is doing pretty well.  She does feel like she has a little stiffness.    Medications:    Current Outpatient Medications:     ALPRAZolam (XANAX) 0.5 MG tablet, alprazolam 0.5 mg tablet, Disp: , Rfl:     cetirizine (ZYRTEC) 10 MG tablet, Take 1 tablet by mouth once daily., Disp: , Rfl:     cyanocobalamin 1,000 mcg/mL injection, cyanocobalamin (vit B-12) 1,000 mcg/mL injection solution, Disp: , Rfl:     dextroamphetamine-amphetamine (ADDERALL) 20 mg tablet, dextroamphetamine-amphetamine 20 mg tablet, Disp: , Rfl:     ergocalciferol (ERGOCALCIFEROL) 50,000 unit Cap, Vitamin D2 1,250 mcg (50,000 unit) capsule, Disp: , Rfl:     estradioL (ESTRACE) 0.5 MG tablet, estradiol 0.5 mg tablet, Disp: , Rfl:     gabapentin (NEURONTIN) 300 MG capsule, gabapentin 300 mg capsule, Disp: , Rfl:     LINZESS 72 mcg Cap capsule, Take 72 mcg by mouth every morning., Disp: , Rfl:     methocarbamoL (ROBAXIN) 500 MG Tab, Take 500 mg by mouth 3 (three) times daily as needed., Disp: , Rfl:     omeprazole (PRILOSEC) 40 MG capsule, Take 40 mg by mouth once daily., Disp: , Rfl:     ondansetron (ZOFRAN) 4 MG tablet, Take 1 tablet (4 mg total) by mouth 2 (two) times daily., Disp: 10 tablet, Rfl: 0    oxyCODONE-acetaminophen (PERCOCET) 5-325 mg per tablet, Take 1 tablet by mouth every 6 (six) hours as needed for Pain., Disp: 28 tablet, Rfl: 0    sucralfate (CARAFATE) 1 gram tablet, sucralfate 1 gram tablet, Disp: , Rfl:     tiZANidine (ZANAFLEX) 4 MG tablet, tizanidine 4 mg tablet, Disp: , Rfl:     traZODone (DESYREL) 100 MG tablet, , Disp: , Rfl:     Allergies:  Review of patient's allergies indicates:   Allergen Reactions    Codeine Itching    Sulfamethoxazole-trimethoprim Itching      Vitals:  BP (!) 144/93   Pulse 99   Ht 5' (1.524 m)   Wt 52 kg (114 lb 10.2 oz)   BMI 22.39 kg/m²     Physical Examination:  Ortho Exam   Right shoulder exam:  Elevation is 170, external rotation 20. She gives me good resistance with rotator cuff strength testing.      Assessment:  1. S/P right rotator cuff repair      Plan:  She will add active assisted exercises.  She will hold off on strengthening.  Follow-up 6 weeks       No follow-ups on file.

## 2022-12-22 ENCOUNTER — TELEPHONE (OUTPATIENT)
Dept: ORTHOPEDICS | Facility: CLINIC | Age: 47
End: 2022-12-22
Payer: MEDICAID

## 2022-12-22 NOTE — TELEPHONE ENCOUNTER
----- Message from Jasper Prabhakar sent at 12/22/2022 10:13 AM CST -----  Contact: Grace ORTEGA Referal agent  Grace is calling in regards to getting clinic notes states the patient was seen by the provide. Please fax it over too  [phone]  Or  call her back at [phone]    Thanks  CF

## 2024-04-12 ENCOUNTER — ANESTHESIA EVENT (OUTPATIENT)
Dept: ENDOSCOPY | Facility: HOSPITAL | Age: 49
End: 2024-04-12
Payer: MEDICAID

## 2024-04-15 ENCOUNTER — ANESTHESIA (OUTPATIENT)
Dept: ENDOSCOPY | Facility: HOSPITAL | Age: 49
End: 2024-04-15
Payer: MEDICAID

## 2024-05-03 RX ORDER — SODIUM CHLORIDE, SODIUM GLUCONATE, SODIUM ACETATE, POTASSIUM CHLORIDE AND MAGNESIUM CHLORIDE 30; 37; 368; 526; 502 MG/100ML; MG/100ML; MG/100ML; MG/100ML; MG/100ML
INJECTION, SOLUTION INTRAVENOUS CONTINUOUS
Status: CANCELLED | OUTPATIENT
Start: 2024-05-03 | End: 2024-06-02

## 2024-05-03 RX ORDER — ONDANSETRON HYDROCHLORIDE 2 MG/ML
4 INJECTION, SOLUTION INTRAVENOUS DAILY PRN
Status: CANCELLED | OUTPATIENT
Start: 2024-05-03

## 2024-05-03 NOTE — ANESTHESIA PREPROCEDURE EVALUATION
2024  Tierra Servin is a 48 y.o., female with   -------------------------------------    ADD (attention deficit disorder)    Anxiety    Depression    Fibromyalgia    GERD (gastroesophageal reflux disease)    Osteopenia    Plantar fasciitis       And   ----------------------------    Arthroscopic repair of rotator cuff of shoulder    Procedure: 1. REPAIR, ROTATOR CUFF, ARTHROSCOPIC 2. SUBACROMIAL DECOMPRESSION 3. OPEN DISTAL CLAVICLE EXCISION;  Surgeon: Anton Cealya MD;  Location: [facility];  Service: Orthopedics;  Laterality: Right;  [place]-NephSubHub anchors/instrumentation, Regeneten patch, microsagittal saw, rasp, baby [place], [employer]     section    Gastric bypass    Hysterectomy    Shoulder surgery     Presents for screening colonscopy  Pt had gastric bypass in  with multiple general anesthetics from 1455-0999 with complications from the bypass - since that time patient has had no issues with her GI system  Never had colonoscopy - this is just screening         Pre-op Assessment    I have reviewed the NPO Status.      Review of Systems  Hepatic/GI:     GERD      Gerd          Neurological:    Neuromuscular Disease,                                 Neuromuscular Disease   Psych:  Psychiatric History                  Physical Exam  General: Well nourished, Cooperative, Alert and Oriented    Airway:  Mallampati: II   Mouth Opening: Normal  TM Distance: Normal  Tongue: Normal  Neck ROM: Normal ROM    Dental:  Dentures  To be removed   Chest/Lungs:  Clear to auscultation, Normal Respiratory Rate    Heart:  Rate: Normal  Rhythm: Regular Rhythm        Anesthesia Plan  Type of Anesthesia, risks & benefits discussed:    Anesthesia Type: Gen Natural Airway  Intra-op Monitoring Plan: Standard ASA Monitors  Post Op Pain Control Plan: IV/PO Opioids PRN  Induction:  IV  Airway Plan:  Direct  Informed Consent: Informed consent signed with the Patient and all parties understand the risks and agree with anesthesia plan.  All questions answered. Patient consented to blood products? No  ASA Score: 2  Day of Surgery Review of History & Physical: H&P Update referred to the surgeon/provider.  Anesthesia Plan Notes: Nasal cannula vs facemask supplemental oxygenation   For patients with MARCIA/obesity, may consider SuperNoval Nasal CPAP      Ready For Surgery From Anesthesia Perspective.     .

## 2024-05-06 ENCOUNTER — HOSPITAL ENCOUNTER (OUTPATIENT)
Facility: HOSPITAL | Age: 49
Discharge: HOME | End: 2024-05-06
Attending: INTERNAL MEDICINE | Admitting: INTERNAL MEDICINE
Payer: MEDICAID

## 2024-05-06 DIAGNOSIS — Z12.11 ENCOUNTER FOR SCREENING COLONOSCOPY: Primary | ICD-10-CM

## 2024-05-06 PROCEDURE — 37000008 HC ANESTHESIA 1ST 15 MINUTES: Performed by: INTERNAL MEDICINE

## 2024-05-06 PROCEDURE — 45378 DIAGNOSTIC COLONOSCOPY: CPT | Performed by: INTERNAL MEDICINE

## 2024-05-06 PROCEDURE — D9220A PRA ANESTHESIA: Mod: CRNA,,,

## 2024-05-06 PROCEDURE — 25000003 PHARM REV CODE 250: Performed by: INTERNAL MEDICINE

## 2024-05-06 PROCEDURE — 63600175 PHARM REV CODE 636 W HCPCS

## 2024-05-06 PROCEDURE — 37000009 HC ANESTHESIA EA ADD 15 MINS: Performed by: INTERNAL MEDICINE

## 2024-05-06 PROCEDURE — 25000003 PHARM REV CODE 250

## 2024-05-06 PROCEDURE — D9220A PRA ANESTHESIA: Mod: ANES,,, | Performed by: ANESTHESIOLOGY

## 2024-05-06 RX ORDER — PROPOFOL 10 MG/ML
VIAL (ML) INTRAVENOUS
Status: COMPLETED
Start: 2024-05-06 | End: 2024-05-06

## 2024-05-06 RX ORDER — FUROSEMIDE 20 MG/1
20 TABLET ORAL DAILY PRN
COMMUNITY

## 2024-05-06 RX ORDER — PROPOFOL 10 MG/ML
VIAL (ML) INTRAVENOUS
Status: DISCONTINUED | OUTPATIENT
Start: 2024-05-06 | End: 2024-05-06

## 2024-05-06 RX ORDER — DEXTROMETHORPHAN/PSEUDOEPHED 2.5-7.5/.8
DROPS ORAL
Status: COMPLETED | OUTPATIENT
Start: 2024-05-06 | End: 2024-05-06

## 2024-05-06 RX ORDER — SODIUM CHLORIDE, SODIUM GLUCONATE, SODIUM ACETATE, POTASSIUM CHLORIDE AND MAGNESIUM CHLORIDE 30; 37; 368; 526; 502 MG/100ML; MG/100ML; MG/100ML; MG/100ML; MG/100ML
INJECTION, SOLUTION INTRAVENOUS CONTINUOUS
Status: SHIPPED | OUTPATIENT
Start: 2024-05-06 | End: 2024-06-05

## 2024-05-06 RX ORDER — SODIUM CHLORIDE, SODIUM LACTATE, POTASSIUM CHLORIDE, CALCIUM CHLORIDE 600; 310; 30; 20 MG/100ML; MG/100ML; MG/100ML; MG/100ML
INJECTION, SOLUTION INTRAVENOUS CONTINUOUS
Status: SHIPPED | OUTPATIENT
Start: 2024-05-06

## 2024-05-06 RX ORDER — LIDOCAINE HYDROCHLORIDE 20 MG/ML
INJECTION, SOLUTION EPIDURAL; INFILTRATION; INTRACAUDAL; PERINEURAL
Status: DISCONTINUED | OUTPATIENT
Start: 2024-05-06 | End: 2024-05-06

## 2024-05-06 RX ORDER — PANTOPRAZOLE SODIUM 40 MG/1
60 TABLET, DELAYED RELEASE ORAL DAILY
COMMUNITY

## 2024-05-06 RX ORDER — DEXTROMETHORPHAN/PSEUDOEPHED 2.5-7.5/.8
DROPS ORAL
Status: DISCONTINUED
Start: 2024-05-06 | End: 2024-05-06 | Stop reason: HOSPADM

## 2024-05-06 RX ADMIN — PROPOFOL 30 MG: 10 INJECTION, EMULSION INTRAVENOUS at 08:05

## 2024-05-06 RX ADMIN — LIDOCAINE HYDROCHLORIDE 4 ML: 20 INJECTION, SOLUTION INTRAVENOUS at 08:05

## 2024-05-06 RX ADMIN — PROPOFOL 70 MG: 10 INJECTION, EMULSION INTRAVENOUS at 08:05

## 2024-05-06 RX ADMIN — SODIUM CHLORIDE, SODIUM GLUCONATE, SODIUM ACETATE, POTASSIUM CHLORIDE AND MAGNESIUM CHLORIDE: 526; 502; 368; 37; 30 INJECTION, SOLUTION INTRAVENOUS at 08:05

## 2024-05-06 NOTE — TRANSFER OF CARE
Anesthesia Transfer of Care Note    Patient: Tierra Servin    Procedure(s) Performed: Procedure(s) (LRB):  COLON (N/A)    Patient location: GI    Anesthesia Type: MAC    Transport from OR: Transported from OR on room air with adequate spontaneous ventilation    Post pain: adequate analgesia    Post assessment: no apparent anesthetic complications and tolerated procedure well    Post vital signs: stable    Level of consciousness: awake and alert    Nausea/Vomiting: no nausea/vomiting    Complications: none    Transfer of care protocol was followed      Last vitals: Visit Vitals  /85 (BP Location: Right arm, Patient Position: Lying)   Pulse 85   Temp 36.8 °C (98.2 °F)   Resp 18   Ht 5' (1.524 m)   Wt 63.5 kg (140 lb)   SpO2 100%   BMI 27.34 kg/m²

## 2024-05-06 NOTE — PROCEDURES
Tierra Servin   MRN: [id number]   ADMISSION DATE: 2024  : 1975  AGE: 48 y.o.    PROCEDURE:  Colonoscopy    DATE OF PROCEDURE:  2024     SURGEON: JULIO CÉSAR LYNCH    PREOPERATIVE DIAGNOSIS:  Screening for colon cancer    POSTOPERATIVE DIAGNOSIS:  1. Limited exam to suboptimal preparation.  2. Internal hemorrhoids, grade 1-2.  Otherwise normal exam    HISTORY AND PHYSICAL:  As per preoperative note.      DESCRIPTION OF PROCEDURE:  Informed consent was obtained.  Patient was placed in left lateral position.  Sedation per anesthesia service.  Rectal exam was unremarkable.  Olympus video colonoscope was introduced into the rectum and was carefully advanced to cecum.  Quality of the preparation was fair to poor limiting visualization of the entire mucosa.  Periodic irrigation was done throughout the procedure for better visualization.  Some parts of the colon could not be clearly visualized despite aggressive irrigation.   Patient tolerated the procedure well without any complications.    FINDINGS:  Cecum ascending colon, transverse colon, descending colon, sigmoid colon and rectum appeared unremarkable to the extent of visualization although some parts of the colon could not be clearly visualized as above due to suboptimal preparation.  There were grade 1-2 internal hemorrhoids noted on withdrawal of the scope.  Terminal ileum was not intubated during the procedure.  Cecum was clearly identified by the presence visual landmarks including appendiceal orifice, ileocecal valve and cecal strap.  Estimated withdrawal time from cecum to rectum was 10 minutes and 34 seconds      ESTIMATED BLOOD LOSS:  None.    COMPLICATIONS:  None.    RECOMMENDATIONS:  1.  Diet as tolerated.  2. Repeat colonoscopy in 1-2 years with a better prep.  Preparation was suboptimal as above.

## 2024-05-06 NOTE — ANESTHESIA POSTPROCEDURE EVALUATION
Anesthesia Post Evaluation    Patient: Tierra Servin    Procedure(s) Performed: Procedure(s) (LRB):  COLON (N/A)    Final Anesthesia Type: general      Patient location during evaluation: PACU  Patient participation: Yes- Able to Participate  Level of consciousness: awake and alert  Post-procedure vital signs: reviewed and stable  Pain management: adequate  Airway patency: patent    PONV status at discharge: No PONV  Anesthetic complications: no      Cardiovascular status: hemodynamically stable  Respiratory status: unassisted  Hydration status: euvolemic  Follow-up not needed.              Vitals Value Taken Time   /85 05/06/24 0909   Temp 36.8 °C (98.2 °F) 05/06/24 0909   Pulse 85 05/06/24 0909   Resp 18 05/06/24 0909   SpO2 100 % 05/06/24 0909         No case tracking events are documented in the log.      Pain/Henry Score: No data recorded

## 2024-05-06 NOTE — DISCHARGE SUMMARY
[facility] Endoscopy  Discharge Note  Short Stay    Procedure(s) (LRB):  COLON (N/A)      OUTCOME: Patient tolerated treatment/procedure well without complication and is now ready for discharge.    DISPOSITION: Home or Self Care    FINAL DIAGNOSIS:  Encounter for screening colonoscopy    FOLLOWUP: In clinic    DISCHARGE INSTRUCTIONS:    Discharge Procedure Orders   Diet general        TIME SPENT ON DISCHARGE: 5 minutes

## 2024-05-07 VITALS
BODY MASS INDEX: 27.48 KG/M2 | SYSTOLIC BLOOD PRESSURE: 123 MMHG | RESPIRATION RATE: 20 BRPM | HEART RATE: 79 BPM | TEMPERATURE: 97 F | OXYGEN SATURATION: 100 % | WEIGHT: 140 LBS | HEIGHT: 60 IN | DIASTOLIC BLOOD PRESSURE: 93 MMHG

## (undated) DEVICE — SEE L#159833

## (undated) DEVICE — TOWEL OR DISP STRL BLUE 4/PK

## (undated) DEVICE — SEE MEDLINE ITEM 157125

## (undated) DEVICE — UNDERPAD PROTECT PLUS 17X24IN

## (undated) DEVICE — DRAPE PLASTIC U 60X72

## (undated) DEVICE — NDL SPINAL 18GX3.5 SPINOCAN

## (undated) DEVICE — SUPPORT SLING SHOT II MEDIUM

## (undated) DEVICE — NDL SUTURE CAPTURE FIRSTPASS

## (undated) DEVICE — MANIFOLD 4 PORT

## (undated) DEVICE — SUT 2/0 36IN COATED VICRYL

## (undated) DEVICE — BLOCK BLOX BITE DENT RIM 54FR

## (undated) DEVICE — SOL IRRI STRL WATER 1000ML

## (undated) DEVICE — PACK BASIC

## (undated) DEVICE — ADAPTER DUAL NSL LUER M-M 7FT

## (undated) DEVICE — CANNULA THREADED 8.5 X 72MM

## (undated) DEVICE — SEE MEDLINE ITEM 146292

## (undated) DEVICE — SEE MEDLINE ITEM 157131

## (undated) DEVICE — Device

## (undated) DEVICE — ELECTRODE REM PLYHSV RETURN 9

## (undated) DEVICE — DRESSING XEROFORM FOIL PK 1X8

## (undated) DEVICE — KIT SURGICAL COLON .25 1.1OZ

## (undated) DEVICE — SEE MEDLINE ITEM 156955

## (undated) DEVICE — SEE MEDLINE ITEM 157117

## (undated) DEVICE — PACK FLUID CONTROL SHOULDER

## (undated) DEVICE — KIT CANIST SUCTION 1200CC

## (undated) DEVICE — DRESSING AQUACEL SACRAL 9 X 9

## (undated) DEVICE — GOWN SURGICAL XX LARGE X LONG

## (undated) DEVICE — PAD ABDOMINAL 5X9 STERILE

## (undated) DEVICE — INSTRAMOD SHOULDER TRACTION

## (undated) DEVICE — SUPPORT ULNA NERVE PROTECTOR

## (undated) DEVICE — CONNECTOR TUBING STR 5 IN 1

## (undated) DEVICE — APPLICATOR CHLORAPREP ORN 26ML

## (undated) DEVICE — SYR 50CC LL

## (undated) DEVICE — ABLATOR EXTENDED LENGTH

## (undated) DEVICE — TUBING SUC UNIV W/CONN 12FT

## (undated) DEVICE — MAT SUCTION PUDDLEVAC ORANGE

## (undated) DEVICE — SEE MEDLINE ITEM 157116

## (undated) DEVICE — WAND COBLATION TURBOVAC 90

## (undated) DEVICE — TAPE ADH MEDIPORE 4 X 10YDS

## (undated) DEVICE — TIP SUCTION YANKAUER

## (undated) DEVICE — TUBE SET INFLOW/OUTFLOW

## (undated) DEVICE — COVER OVERHEAD SURG LT BLUE

## (undated) DEVICE — DRAPE STERI-DRAPE 1000 17X11IN

## (undated) DEVICE — PAD PREP 50/CA

## (undated) DEVICE — BLADE SURG CARBON STEEL SZ11

## (undated) DEVICE — GLOVE BIOGEL PI MICRO INDIC 8

## (undated) DEVICE — GLOVE BIOGEL ORTHOPEDIC 8

## (undated) DEVICE — DRAPE STERI U-SHAPED 47X51IN

## (undated) DEVICE — COLLECTION SPECIMEN NEPTUNE

## (undated) DEVICE — GAUZE SPONGE 4X4 12PLY